# Patient Record
Sex: FEMALE | Race: WHITE | NOT HISPANIC OR LATINO | Employment: UNEMPLOYED | URBAN - METROPOLITAN AREA
[De-identification: names, ages, dates, MRNs, and addresses within clinical notes are randomized per-mention and may not be internally consistent; named-entity substitution may affect disease eponyms.]

---
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
## 2023-04-27 NOTE — ANESTHESIA POSTPROCEDURE EVALUATION
Anesthesia Post Evaluation    Patient: Karin Katz    Procedure(s) Performed: Procedure(s) (LRB):  EGD (ESOPHAGOGASTRODUODENOSCOPY)(Positive covid 01/03/2021) (N/A)  COLONOSCOPY (N/A)    Final Anesthesia Type: general      Patient location during evaluation: PACU  Patient participation: Yes- Able to Participate  Level of consciousness: sedated and awake  Post-procedure vital signs: reviewed and stable  Pain management: adequate  Airway patency: patent    PONV status at discharge: No PONV  Anesthetic complications: no      Cardiovascular status: blood pressure returned to baseline  Respiratory status: spontaneous ventilation  Hydration status: euvolemic  Follow-up not needed.          Vitals Value Taken Time   BP 93/46 01/13/22 1005   Temp  01/13/22 1116   Pulse 58 01/13/22 1005   Resp 16 01/13/22 0911   SpO2 87 % 01/13/22 1005   Vitals shown include unvalidated device data.      Event Time   Out of Recovery 10:02:58         Pain/Yuli Score: Yuli Score: 10 (1/13/2022  9:45 AM)         Pt was taken to CT via bed @ 6:55am and returned @ 7:18am. Pt was hooked up to IV, b/p cuff, Leads, bed in park, call light given, rails up. Ticket to ride was filled out by NORY Deng.

## 2024-05-13 ENCOUNTER — HOSPITAL ENCOUNTER (EMERGENCY)
Facility: HOSPITAL | Age: 19
Discharge: HOME | End: 2024-05-13
Attending: EMERGENCY MEDICINE
Payer: COMMERCIAL

## 2024-05-13 VITALS
BODY MASS INDEX: 19.29 KG/M2 | TEMPERATURE: 98 F | RESPIRATION RATE: 17 BRPM | OXYGEN SATURATION: 98 % | HEART RATE: 62 BPM | WEIGHT: 120 LBS | HEIGHT: 66 IN | DIASTOLIC BLOOD PRESSURE: 64 MMHG | SYSTOLIC BLOOD PRESSURE: 98 MMHG

## 2024-05-13 DIAGNOSIS — R05.9 COUGH: ICD-10-CM

## 2024-05-13 DIAGNOSIS — R19.7 DIARRHEA, UNSPECIFIED TYPE: ICD-10-CM

## 2024-05-13 DIAGNOSIS — J18.9 PNEUMONIA OF RIGHT MIDDLE LOBE DUE TO INFECTIOUS ORGANISM: Primary | ICD-10-CM

## 2024-05-13 LAB
ALBUMIN SERPL BCP-MCNC: 4.4 G/DL (ref 3.5–5.2)
ALP SERPL-CCNC: 104 U/L (ref 55–135)
ALT SERPL W/O P-5'-P-CCNC: 19 U/L (ref 10–44)
ANION GAP SERPL CALC-SCNC: 12 MMOL/L (ref 8–16)
AST SERPL-CCNC: 23 U/L (ref 10–40)
B-HCG UR QL: NEGATIVE
BASOPHILS NFR BLD: 1 % (ref 0–1.9)
BILIRUB SERPL-MCNC: 1 MG/DL (ref 0.1–1)
BILIRUB UR QL STRIP: NEGATIVE
BUN SERPL-MCNC: 12 MG/DL (ref 6–20)
CALCIUM SERPL-MCNC: 9.9 MG/DL (ref 8.7–10.5)
CHLORIDE SERPL-SCNC: 102 MMOL/L (ref 95–110)
CLARITY UR: CLEAR
CO2 SERPL-SCNC: 23 MMOL/L (ref 23–29)
COLOR UR: COLORLESS
CREAT SERPL-MCNC: 0.8 MG/DL (ref 0.5–1.4)
CTP QC/QA: YES
DIFFERENTIAL METHOD BLD: ABNORMAL
EOSINOPHIL NFR BLD: 49 % (ref 0–8)
ERYTHROCYTE [DISTWIDTH] IN BLOOD BY AUTOMATED COUNT: 11.4 % (ref 11.5–14.5)
EST. GFR  (NO RACE VARIABLE): >60 ML/MIN/1.73 M^2
GLUCOSE SERPL-MCNC: 78 MG/DL (ref 70–110)
GLUCOSE UR QL STRIP: NEGATIVE
HCT VFR BLD AUTO: 46.1 % (ref 37–48.5)
HGB BLD-MCNC: 15.6 G/DL (ref 12–16)
HGB UR QL STRIP: NEGATIVE
IMM GRANULOCYTES # BLD AUTO: ABNORMAL K/UL
IMM GRANULOCYTES NFR BLD AUTO: ABNORMAL %
INFLUENZA A, MOLECULAR: NEGATIVE
INFLUENZA B, MOLECULAR: NEGATIVE
KETONES UR QL STRIP: NEGATIVE
LEUKOCYTE ESTERASE UR QL STRIP: NEGATIVE
LIPASE SERPL-CCNC: 9 U/L (ref 4–60)
LYMPHOCYTES NFR BLD: 20 % (ref 18–48)
MCH RBC QN AUTO: 32.8 PG (ref 27–31)
MCHC RBC AUTO-ENTMCNC: 33.8 G/DL (ref 32–36)
MCV RBC AUTO: 97 FL (ref 82–98)
MONOCYTES NFR BLD: 1 % (ref 4–15)
NEUTROPHILS NFR BLD: 29 % (ref 38–73)
NITRITE UR QL STRIP: NEGATIVE
NRBC BLD-RTO: 0 /100 WBC
PH UR STRIP: 7 [PH] (ref 5–8)
PLATELET # BLD AUTO: 284 K/UL (ref 150–450)
PLATELET BLD QL SMEAR: ABNORMAL
PMV BLD AUTO: 10.7 FL (ref 9.2–12.9)
POTASSIUM SERPL-SCNC: 4.3 MMOL/L (ref 3.5–5.1)
PROT SERPL-MCNC: 7.9 G/DL (ref 6–8.4)
PROT UR QL STRIP: NEGATIVE
RBC # BLD AUTO: 4.75 M/UL (ref 4–5.4)
SARS-COV-2 RDRP RESP QL NAA+PROBE: NEGATIVE
SODIUM SERPL-SCNC: 137 MMOL/L (ref 136–145)
SP GR UR STRIP: 1 (ref 1–1.03)
SPECIMEN SOURCE: NORMAL
URN SPEC COLLECT METH UR: ABNORMAL
UROBILINOGEN UR STRIP-ACNC: NEGATIVE EU/DL
WBC # BLD AUTO: 16.18 K/UL (ref 3.9–12.7)

## 2024-05-13 PROCEDURE — 63600175 PHARM REV CODE 636 W HCPCS: Performed by: PHYSICIAN ASSISTANT

## 2024-05-13 PROCEDURE — 36415 COLL VENOUS BLD VENIPUNCTURE: CPT | Performed by: NURSE PRACTITIONER

## 2024-05-13 PROCEDURE — 85007 BL SMEAR W/DIFF WBC COUNT: CPT | Performed by: NURSE PRACTITIONER

## 2024-05-13 PROCEDURE — 81003 URINALYSIS AUTO W/O SCOPE: CPT | Performed by: NURSE PRACTITIONER

## 2024-05-13 PROCEDURE — 87502 INFLUENZA DNA AMP PROBE: CPT | Performed by: NURSE PRACTITIONER

## 2024-05-13 PROCEDURE — U0002 COVID-19 LAB TEST NON-CDC: HCPCS | Performed by: NURSE PRACTITIONER

## 2024-05-13 PROCEDURE — 83690 ASSAY OF LIPASE: CPT | Performed by: NURSE PRACTITIONER

## 2024-05-13 PROCEDURE — 25000003 PHARM REV CODE 250: Performed by: PHYSICIAN ASSISTANT

## 2024-05-13 PROCEDURE — 96374 THER/PROPH/DIAG INJ IV PUSH: CPT

## 2024-05-13 PROCEDURE — 99285 EMERGENCY DEPT VISIT HI MDM: CPT | Mod: 25

## 2024-05-13 PROCEDURE — 96375 TX/PRO/DX INJ NEW DRUG ADDON: CPT

## 2024-05-13 PROCEDURE — 96361 HYDRATE IV INFUSION ADD-ON: CPT

## 2024-05-13 PROCEDURE — 81025 URINE PREGNANCY TEST: CPT | Performed by: NURSE PRACTITIONER

## 2024-05-13 PROCEDURE — 25500020 PHARM REV CODE 255

## 2024-05-13 PROCEDURE — 80053 COMPREHEN METABOLIC PANEL: CPT | Performed by: NURSE PRACTITIONER

## 2024-05-13 PROCEDURE — 85027 COMPLETE CBC AUTOMATED: CPT | Performed by: NURSE PRACTITIONER

## 2024-05-13 RX ORDER — BENZONATATE 100 MG/1
200 CAPSULE ORAL ONCE
Status: COMPLETED | OUTPATIENT
Start: 2024-05-13 | End: 2024-05-13

## 2024-05-13 RX ORDER — ONDANSETRON HYDROCHLORIDE 2 MG/ML
4 INJECTION, SOLUTION INTRAVENOUS
Status: COMPLETED | OUTPATIENT
Start: 2024-05-13 | End: 2024-05-13

## 2024-05-13 RX ORDER — FAMOTIDINE 10 MG/ML
20 INJECTION INTRAVENOUS
Status: COMPLETED | OUTPATIENT
Start: 2024-05-13 | End: 2024-05-13

## 2024-05-13 RX ORDER — PROMETHAZINE HYDROCHLORIDE AND DEXTROMETHORPHAN HYDROBROMIDE 6.25; 15 MG/5ML; MG/5ML
5 SYRUP ORAL EVERY 8 HOURS PRN
Qty: 60 ML | Refills: 0 | Status: SHIPPED | OUTPATIENT
Start: 2024-05-13 | End: 2024-05-23

## 2024-05-13 RX ORDER — AMOXICILLIN AND CLAVULANATE POTASSIUM 875; 125 MG/1; MG/1
1 TABLET, FILM COATED ORAL
Status: COMPLETED | OUTPATIENT
Start: 2024-05-13 | End: 2024-05-13

## 2024-05-13 RX ORDER — DOXYCYCLINE HYCLATE 100 MG
100 TABLET ORAL
Status: COMPLETED | OUTPATIENT
Start: 2024-05-13 | End: 2024-05-13

## 2024-05-13 RX ORDER — AMOXICILLIN AND CLAVULANATE POTASSIUM 875; 125 MG/1; MG/1
1 TABLET, FILM COATED ORAL 2 TIMES DAILY
Qty: 14 TABLET | Refills: 0 | Status: SHIPPED | OUTPATIENT
Start: 2024-05-13

## 2024-05-13 RX ORDER — DOXYCYCLINE 100 MG/1
100 CAPSULE ORAL 2 TIMES DAILY
Qty: 14 CAPSULE | Refills: 0 | Status: SHIPPED | OUTPATIENT
Start: 2024-05-13 | End: 2024-05-20

## 2024-05-13 RX ORDER — BENZONATATE 100 MG/1
100 CAPSULE ORAL 3 TIMES DAILY PRN
Qty: 20 CAPSULE | Refills: 0 | Status: SHIPPED | OUTPATIENT
Start: 2024-05-13

## 2024-05-13 RX ADMIN — SODIUM CHLORIDE 1000 ML: 9 INJECTION, SOLUTION INTRAVENOUS at 01:05

## 2024-05-13 RX ADMIN — AMOXICILLIN AND CLAVULANATE POTASSIUM 1 TABLET: 875; 125 TABLET, FILM COATED ORAL at 01:05

## 2024-05-13 RX ADMIN — BENZONATATE 200 MG: 100 CAPSULE ORAL at 02:05

## 2024-05-13 RX ADMIN — DOXYCYCLINE HYCLATE 100 MG: 100 TABLET, COATED ORAL at 01:05

## 2024-05-13 RX ADMIN — FAMOTIDINE 20 MG: 10 INJECTION, SOLUTION INTRAVENOUS at 01:05

## 2024-05-13 RX ADMIN — IOHEXOL 75 ML: 350 INJECTION, SOLUTION INTRAVENOUS at 02:05

## 2024-05-13 RX ADMIN — ONDANSETRON 4 MG: 2 INJECTION INTRAMUSCULAR; INTRAVENOUS at 01:05

## 2024-05-13 NOTE — FIRST PROVIDER EVALUATION
Emergency Department TeleTriage Encounter Note      CHIEF COMPLAINT    Chief Complaint   Patient presents with    Cough     X 3 months        VITAL SIGNS   Initial Vitals [05/13/24 1141]   BP Pulse Resp Temp SpO2   112/71 83 20 98.1 °F (36.7 °C) 96 %      MAP       --            ALLERGIES    Review of patient's allergies indicates:   Allergen Reactions    Cymbalta [duloxetine] Hives and Other (See Comments)     Suicidal        PROVIDER TRIAGE NOTE  19-year-old female who states she is currently being treated for Lyme disease with 500 mg of azithromycin daily for the past 6 months, presents to the ER with complaints of ongoing productive cough for the past 2-3 months.  She has been seen at urgent care several times on prescription cough medicine without improvement.  She reports a harsh productive cough with green sputum.  Additionally she states last night she lives she ate symptom onset, and is concerned about having possible Salmonella, and reports red mucousy colored diarrhea.  She reports several worried diarrhea bowel movement onset.  She reports all over diffuse abdominal pain as well.  She denies any fever.  Shortness of breath.  No runny nose or congestion.  No sore throat.  No urinary complaints.  No vaginal discharge or bleeding.    AAOx3, respirations even and non- labored, stable vitals, normal coloration of skin, sitting upright in triage chair, appears in no acute distress.        ORDERS  Labs Reviewed - No data to display    ED Orders (720h ago, onward)      None              Virtual Visit Note: The provider triage portion of this emergency department evaluation and documentation was performed via Nanoference, a HIPAA-compliant telemedicine application, in concert with a tele-presenter in the room. A face to face patient evaluation with one of my colleagues will occur once the patient is placed in an emergency department room.      DISCLAIMER: This note was prepared with M*Modal voice recognition  transcription software. Garbled syntax, mangled pronouns, and other bizarre constructions may be attributed to that software system.

## 2024-05-13 NOTE — ED PROVIDER NOTES
"Encounter Date: 5/13/2024       History     Chief Complaint   Patient presents with    Cough     X 3 months      Karin Katz is a 19 y.o. female presenting for evaluation of persistent "bronchitis" for the last several weeks.  She was evaluated by Urgent Care a couple of weeks ago and took the prescribed medications, but has noticed little improvement.  No particular difficulty breathing or shortness of breath.  She does smoke marijuana, but denies vaping.  No previous history of chronic lung disease.  In addition, she noticed some bloody/mucousy stool this morning and has a history of previous intraabdominal issues.  Some nausea and vomiting.  No dysuria or hematuria.  She states she has been taking Azithromycin for a while after being diagnosed with Lyme Disease.   She has a past medical history of Exocrine pancreatic insufficiency (12/2021), Migraine (2011), and PTSD (post-traumatic stress disorder) (2019).      The history is provided by the patient.     Review of patient's allergies indicates:   Allergen Reactions    Cymbalta [duloxetine] Hives and Other (See Comments)     Suicidal      Past Medical History:   Diagnosis Date    Exocrine pancreatic insufficiency 12/2021    Migraine 2011    PTSD (post-traumatic stress disorder) 2019     Past Surgical History:   Procedure Laterality Date    COLONOSCOPY N/A 1/13/2022    Procedure: COLONOSCOPY;  Surgeon: Betzy Kerr MD;  Location: [facility];  Service: Endoscopy;  Laterality: N/A;    DIAGNOSTIC LAPAROSCOPY N/A 5/2/2022    Procedure: LAPAROSCOPY, DIAGNOSTIC;  Surgeon: Jerri Schroeder MD;  Location: [facility] OR;  Service: OB/GYN;  Laterality: N/A;    ESOPHAGOGASTRODUODENOSCOPY N/A 1/13/2022    Procedure: EGD (ESOPHAGOGASTRODUODENOSCOPY)(Positive covid 01/03/2021);  Surgeon: Betzy Kerr MD;  Location: [facility];  Service: Endoscopy;  Laterality: N/A;    INTRAUTERINE DEVICE INSERTION N/A 5/2/2022    Procedure: INSERTION, INTRAUTERINE DEVICE;  " Surgeon: Jerri Schroeder MD;  Location: [facility];  Service: OB/GYN;  Laterality: N/A;    SURGICAL REMOVAL OF ENDOMETRIOMA N/A 5/2/2022    Procedure: EXCISION, ENDOMETRIOMA;  Surgeon: Jerri Schroeder MD;  Location: [facility] OR;  Service: OB/GYN;  Laterality: N/A;    UPPER GASTROINTESTINAL ENDOSCOPY       Family History   Problem Relation Name Age of Onset    Hyperlipidemia Father      Colon cancer Neg Hx      Colon polyps Neg Hx       Social History     Tobacco Use    Smoking status: Never    Smokeless tobacco: Never    Tobacco comments:     DO NOT SMOKE DOS   Substance Use Topics    Alcohol use: Never    Drug use: Yes     Types: Marijuana     Comment: eatibilies      Review of Systems   Constitutional:  Negative for chills and fever.   Respiratory:  Positive for cough. Negative for chest tightness, shortness of breath and wheezing.    Cardiovascular:  Negative for chest pain and palpitations.   Gastrointestinal:  Positive for abdominal pain, blood in stool, diarrhea, nausea and vomiting. Negative for constipation.   Genitourinary:  Negative for dysuria and hematuria.   Musculoskeletal:  Negative for arthralgias, back pain, joint swelling, myalgias, neck pain and neck stiffness.   Skin:  Negative for color change, pallor, rash and wound.   Neurological:  Negative for weakness and numbness.   Hematological:  Does not bruise/bleed easily.       Physical Exam     Initial Vitals [05/13/24 1141]   BP Pulse Resp Temp SpO2   112/71 83 20 98.1 °F (36.7 °C) 96 %      MAP       --         Physical Exam    Nursing note and vitals reviewed.  Constitutional: She appears well-developed and well-nourished. She is not diaphoretic. No distress.   HENT:   Head: Normocephalic and atraumatic.   Mouth/Throat: Oropharynx is clear and moist.   Eyes: Conjunctivae are normal.   Neck: Neck supple.   Normal range of motion.  Cardiovascular:  Normal rate, regular rhythm, normal heart sounds and intact distal pulses.     Exam reveals  no gallop and no friction rub.       No murmur heard.  Pulmonary/Chest: Breath sounds normal. No respiratory distress. She has no wheezes. She has no rhonchi. She has no rales.   Equal, bilateral breath sounds noted without wheezing.     Abdominal: Abdomen is soft. She exhibits no distension and no mass. There is abdominal tenderness.   Mild TTP noted across left lower abdomen.    Musculoskeletal:         General: No tenderness or edema. Normal range of motion.      Cervical back: Normal range of motion and neck supple.     Neurological: She is alert and oriented to person, place, and time. She has normal strength. No sensory deficit.   Skin: Skin is warm and dry. No rash and no abscess noted. No erythema.   Psychiatric: She has a normal mood and affect.         ED Course   Procedures  Labs Reviewed   CBC W/ AUTO DIFFERENTIAL - Abnormal; Notable for the following components:       Result Value    WBC 16.18 (*)     MCH 32.8 (*)     RDW 11.4 (*)     Gran % 29.0 (*)     Mono % 1.0 (*)     Eosinophil % 49.0 (*)     All other components within normal limits   URINALYSIS, REFLEX TO URINE CULTURE - Abnormal; Notable for the following components:    Color, UA Colorless (*)     All other components within normal limits    Narrative:     Specimen Source->Urine   INFLUENZA A & B BY MOLECULAR   SARS-COV-2 RNA AMPLIFICATION, QUAL   COMPREHENSIVE METABOLIC PANEL   LIPASE   POCT URINE PREGNANCY          Imaging Results              CT Abdomen Pelvis With IV Contrast NO Oral Contrast (Final result)  Result time 05/13/24 14:31:10      Final result by Whitney Benavidez MD (05/13/24 14:31:10)                   Impression:      There is right greater than left basilar consolidation concerning for pneumonia.      Electronically signed by: Whitney Benavidez MD  Date:    05/13/2024  Time:    14:31               Narrative:    EXAMINATION:  CT ABDOMEN PELVIS WITH IV CONTRAST    CLINICAL HISTORY:  Abdominal abscess/infection  suspected;Nausea/vomiting;    TECHNIQUE:  Low dose axial images, sagittal and coronal reformations were obtained from the lung bases to the pubic symphysis following the IV administration of 75 mL of Omnipaque 350 and the oral administration of 30 mL of Omnipaque 350.    COMPARISON:  None.    FINDINGS:  There is consolidation at left greater than right lung bases.    The liver, spleen, adrenal glands, right kidney and pancreas are unremarkable.  There is a 5 mm cyst seen within the midpole of the left kidney.    The portal vein, splenic vein and superior mesenteric veins are patent.    There is no evidence bowel obstruction.  Stool is seen throughout the colon.  The appendix is not definitively identified.    There is an IUD seen within the uterus.  There is a small amount of intra-abdominal free fluid seen in the pelvis.                                        X-Ray Chest PA And Lateral (Final result)  Result time 05/13/24 12:18:08      Final result by Whitney Benavidez MD (05/13/24 12:18:08)                   Impression:      There are new regions of consolidation within the lingula and right middle lobe of the lung concerning for pneumonia.  This report was flagged in Epic as abnormal.      Electronically signed by: Whitney Benavidez MD  Date:    05/13/2024  Time:    12:18               Narrative:    EXAMINATION:  XR CHEST PA AND LATERAL    CLINICAL HISTORY:  Cough, unspecified    TECHNIQUE:  PA and lateral views of the chest were performed.    COMPARISON:  05/05/2022    FINDINGS:  There is consolidation at the left greater than right lung base new from prior exam.  There is no evidence pneumothorax.  The cardiac silhouette is similar to prior exam.                                       Medications   sodium chloride 0.9% bolus 1,000 mL 1,000 mL (1,000 mLs Intravenous New Bag 5/13/24 1349)   amoxicillin-clavulanate 875-125mg per tablet 1 tablet (1 tablet Oral Given 5/13/24 1349)   doxycycline tablet 100 mg (100 mg  Oral Given 5/13/24 1349)   ondansetron injection 4 mg (4 mg Intravenous Given 5/13/24 1349)   famotidine (PF) injection 20 mg (20 mg Intravenous Given 5/13/24 1349)   benzonatate capsule 200 mg (200 mg Oral Given 5/13/24 1442)   iohexoL (OMNIPAQUE 350) 350 mg iodine/mL injection (75 mLs Intravenous Given 5/13/24 1424)     Medical Decision Making  Differential Diagnosis:   Influenza  Pneumonia  Strep pharyngitis  Meningitis  Viral syndrome  UTI   Acute Abdomen     Pt emergently evaluated here in the ED.    Influenza and COVID-19 are negative.  Chest x-ray does show evidence for likely right middle lobe pneumonia.  Labs are consistent with some leukocytosis at 16.  Normal electrolytes, normal renal function, normal LFTs.  Lipase is negative.  CT of the abdomen and pelvis shows no evidence for acute intra-abdominal process at this time.  She was unable to leave a stool sample here in the emergency department.  Will initiate antibiotics for the pneumonia.  We feel comfortable discharging her home to follow up with a primary care provider for re-evaluation and further management.  She voices understanding and is agreeable with the plan.  She is given specific return precautions.    Amount and/or Complexity of Data Reviewed  Labs: ordered. Decision-making details documented in ED Course.  Radiology: ordered. Decision-making details documented in ED Course.    Risk  OTC drugs.  Prescription drug management.                                      Clinical Impression:  Final diagnoses:  [R05.9] Cough  [J18.9] Pneumonia of right middle lobe due to infectious organism (Primary)  [R19.7] Diarrhea, unspecified type          ED Disposition Condition    Discharge Stable          ED Prescriptions       Medication Sig Dispense Start Date End Date Auth. Provider    doxycycline (VIBRAMYCIN) 100 MG Cap Take 1 capsule (100 mg total) by mouth 2 (two) times daily. for 7 days 14 capsule 5/13/2024 5/20/2024 Alka Saunders PA-C     amoxicillin-clavulanate 875-125mg (AUGMENTIN) 875-125 mg per tablet Take 1 tablet by mouth 2 (two) times daily. 14 tablet 5/13/2024 -- Alka Saunders PA-C    benzonatate (TESSALON) 100 MG capsule Take 1 capsule (100 mg total) by mouth 3 (three) times daily as needed for Cough. 20 capsule 5/13/2024 -- Alka Saunders PA-C    promethazine-dextromethorphan (PROMETHAZINE-DM) 6.25-15 mg/5 mL Syrp Take 5 mLs by mouth every 8 (eight) hours as needed (cough and nausea). 60 mL 5/13/2024 5/23/2024 Alka Saunders PA-C          Follow-up Information       Follow up With Specialties Details Why Contact Info Additional Information    [place] [facility] Emergency Medicine  As needed, If symptoms worsen [address] [place] [address] [address] [place] [address] 1st floor    Apolonia Beauchamp MD Family Medicine  As needed [address] [facility]  [address]  [place] [address]  [phone]                Alka Saunders PA-C  05/13/24 [id number]

## 2024-05-20 ENCOUNTER — NURSE TRIAGE (OUTPATIENT)
Dept: ADMINISTRATIVE | Facility: CLINIC | Age: 19
End: 2024-05-20

## 2024-05-20 NOTE — TELEPHONE ENCOUNTER
Was in the ED on 5/13/24 and was diagnosed with pneumonia. Has been on antibiotics since and was advised to follow up with a MD in [place]. She is not in [place]. Does not feel like her lungs are any better. Persistent cough and lungs feel blocked and has SOB.  Reason for Disposition   New-onset or worsening chest pain    Additional Information   Negative: SEVERE difficulty breathing (e.g., struggling for each breath, speaks in single words)   Negative: Bluish (or gray) lips or face now   Negative: Difficult to awaken or acting confused (e.g., disoriented, slurred speech)   Negative: Stridor   Negative: Slow, shallow and weak breathing   Negative: Sounds like a life-threatening emergency to the triager   Negative: Recently discharged from hospital with diagnosis of pneumonia    Protocols used: Pneumonia on Antibiotic Follow-up Call-A-

## (undated) DEVICE — SUTURE MONOCRYL 4-0 PS-2 Y496G

## (undated) DEVICE — BANDAID 3/4

## (undated) DEVICE — SLEEVE TROCAR 5MMX100MM  CTS02

## (undated) DEVICE — PACK LITHOTOMY 88521

## (undated) DEVICE — GLOVE BIOGEL PI ULTRA TOUCH GRAY SZ 8.5

## (undated) DEVICE — TRAY GENERAL LAPAROSCOPY

## (undated) DEVICE — CATHETER URETHRAL RED 16FR

## (undated) DEVICE — JELLY LUBRICATING TUBE 4OZ 4OZLUB

## (undated) DEVICE — TROCAR OPTICAL ZTHREAD 12MMX100MM CTF73

## (undated) DEVICE — DRAPE IOBAN 13X13 6640EZ

## (undated) DEVICE — CABLE MONOPOLAR 10FT DISPOSABLE

## (undated) DEVICE — PACK LAPAROSCOPY I 88088

## (undated) DEVICE — SUTURE VICRYL 0 UR-5 27 VCP376H

## (undated) DEVICE — SOLUTION NACL 0.9% 3000ML

## (undated) DEVICE — SPONGE XRAY DETECTABLE 4X4

## (undated) DEVICE — SUCTION/IRRIGATOR W/TIP

## (undated) DEVICE — TRAY SKIN PREP DRY

## (undated) DEVICE — DERMABOND HVD MINI  DHVM12

## (undated) DEVICE — TROCAR OPTICAL ZTHREAD 5MMX100MM CTF03

## (undated) DEVICE — COVER LIGHT HANDLE LB53

## (undated) DEVICE — SOLUTION IRRI NS BOTTLE 1000ML R5200-01

## (undated) DEVICE — SOLUTION SCRUB IODINE 4OZ

## (undated) DEVICE — TROCAR BLADED ZTHREAD 5MMX100MM CTB03

## (undated) DEVICE — PAD BOVIE ADULT

## (undated) DEVICE — PAD MATERNITY

## (undated) DEVICE — SOLUTION PREP IODINE 4OZ

## (undated) DEVICE — SOLUTION IRRI H2O BOTTLE 1000ML

## (undated) DEVICE — GOWN SURG. AERO CHROME XXL